# Patient Record
Sex: MALE | Race: WHITE | NOT HISPANIC OR LATINO | Employment: FULL TIME | URBAN - METROPOLITAN AREA
[De-identification: names, ages, dates, MRNs, and addresses within clinical notes are randomized per-mention and may not be internally consistent; named-entity substitution may affect disease eponyms.]

---

## 2021-08-19 ENCOUNTER — TELEPHONE (OUTPATIENT)
Dept: CARDIOLOGY | Facility: MEDICAL CENTER | Age: 51
End: 2021-08-19

## 2021-08-19 NOTE — TELEPHONE ENCOUNTER
Called patient to request records for NP appointment with [facility]. Called to confirm if this will be first time patient is seeing a cardiologist and if pt has had any recent lab work or cardaic imaging done. No answer, left voicemail to call back.

## 2021-08-24 ASSESSMENT — ENCOUNTER SYMPTOMS
VOMITING: 0
ABDOMINAL PAIN: 0
FEVER: 0
PALPITATIONS: 0
SYNCOPE: 0
PND: 0
WEAKNESS: 0
IRREGULAR HEARTBEAT: 0
NAUSEA: 0
DIZZINESS: 0
COUGH: 0
DIARRHEA: 0
NIGHT SWEATS: 0
FOCAL WEAKNESS: 0
ORTHOPNEA: 0
NEAR-SYNCOPE: 0
SHORTNESS OF BREATH: 0
DYSPNEA ON EXERTION: 0
WHEEZING: 0

## 2021-08-24 NOTE — PROGRESS NOTES
Cardiology Initial Consultation Note    Date of note:    8/25/2021    Primary Care Provider:  LEXY Stoddard  Referring Provider:  LEXY Stoddard    Patient Name: Jose Woodruff   YOB: 1970  MRN:              [id number]    Chief Complaint: Syncope    History of Present Illness: Mr. Jose Woodruff is a 51 y.o. male whose current medical problems include dyslipidemia, hypertension, possible paroxysmal afib (diagnosed in 2007 per patient, no recurrence since) who is here for cardiac consultation for syncope.    The patient was recently seen in primary care office on 7/9/2021 by LEXY Stoddard.  During the visit, he reported 2 episodes of syncope, 20 minutes apart.  Lisinopril was increased to 40 mg during the visit due to elevated blood pressure.    The patient presents today to discuss symptoms.  He denies any prior syncope episodes.  He had not had any recurrent episodes since the two episodes in early June.  He denies any prodromal symptoms prior to the syncope.  The first episode he was just standing in his living room, and the next thing he knew he was on the floor.  The second episode he was in the bathroom and felt nauseated.  He denies any chest pain or palpitations prior.  He did not seek care after the episode as he felt back to normal.  The patient walks his dog daily for about an hour, at a fast pace without any chest pain or shortness of breath.  He denies any orthopnea, PND, or leg swelling.  No palpitations.    Of note, the patient had a stress test over 10 years ago in August 2010, which was negative.  He also had an episode of A. fib prior around 2007, which he had not had recurrence.    Cardiovascular Risk Factors:  1. Smoking status: Never smoker  2. Type II Diabetes Mellitus: None/not recently checked  3. Hypertension: On medication   4. Dyslipidemia: Diet controlled   5. Family history of early Coronary Artery Disease in a first degree relative (Male less than 55 years of age;  "Female less than 65 years of age): None  6.  Obesity and/or Metabolic Syndrome: BMI 26.29  7. Sedentary lifestyle: Walks dogs/fast walks    Review of Systems   Constitutional: Negative for fever, malaise/fatigue and night sweats.   Cardiovascular: Negative for chest pain, dyspnea on exertion, irregular heartbeat, leg swelling, near-syncope, orthopnea, palpitations, paroxysmal nocturnal dyspnea and syncope.   Respiratory: Negative for cough, shortness of breath and wheezing.    Gastrointestinal: Negative for abdominal pain, diarrhea, nausea and vomiting.   Neurological: Negative for dizziness, focal weakness and weakness.       All other systems reviewed and are negative.         Current Outpatient Medications   Medication Sig Dispense Refill   • lisinopril (PRINIVIL) 40 MG tablet      • ASPIRIN LOW DOSE 81 MG EC tablet  (Patient not taking: Reported on 8/25/2021)       No current facility-administered medications for this visit.         Not on File      Physical Exam:  Ambulatory Vitals  /72 (BP Location: Right arm, Patient Position: Sitting, BP Cuff Size: Adult)   Pulse 77   Resp 14   Ht 1.753 m (5' 9\")   Wt 80.7 kg (178 lb)   SpO2 97%    Oxygen Therapy:  Pulse Oximetry: 97 %  BP Readings from Last 4 Encounters:   08/25/21 114/72       Weight/BMI: Body mass index is 26.29 kg/m².  Wt Readings from Last 4 Encounters:   08/25/21 80.7 kg (178 lb)         General: Well appearing and in no apparent distress  Eyes: nl conjunctiva, no icteric sclera  ENT: wearing a mask, normal external appearance of ears  Neck: no visible JVP,  no carotid bruits  Lungs: normal respiratory effort, CTAB  Heart: RRR, no murmurs, no rubs or gallops,  no edema bilateral lower extremities. No LV/RV heave on cardiac palpatation. + bilateral radial pulses.  + bilateral dp pulses.   Abdomen: soft, non tender, non distended, no masses, normal bowel sounds.  No HSM.  Extremities/MSK: no clubbing, no cyanosis  Neurological: No focal " sensory deficits  Psychiatric: Appropriate affect, A/O x 3, intact judgement and insight  Skin: Warm extremities      Lab Data Review:  No results found for: CHOLSTRLTOT, LDL, HDL, TRIGLYCERIDE    No results found for: SODIUM, POTASSIUM, CHLORIDE, CO2, GLUCOSE, BUN, CREATININE, BUNCREATRAT, GLOMRATE  No results found for: ALKPHOSPHAT, ASTSGOT, ALTSGPT, TBILIRUBIN   No results found for: WBC  No results found for: HBA1C      Cardiac Imaging and Procedures Review:    EKG dated 8/25/2021: My personal interpretation is sinus rhythm, PAC      Assessment & Plan     1. Hypertension, unspecified type  EKG    EC-ECHOCARDIOGRAM COMPLETE W/O CONT   2. Paroxysmal A-fib (HCC)  EC-ECHOCARDIOGRAM COMPLETE W/O CONT   3. Syncope and collapse  EC-ECHOCARDIOGRAM COMPLETE W/O CONT    Cardiac Event Monitor   4. Dyslipidemia           Shared Medical Decision Making:    Syncope  No prodromal symptoms.  No prior episodes or recurrence  -We will obtain echocardiogram to assess LVEF and any structural abnormalities  -Event monitor to assess for any arrhythmias or heart blocks    Hypertension  BP well controlled this visit  -Continue lisinopril 40 mg daily    Possible paroxysmal A. Fib  As reported per the patient  [id number] of 1  -Continue aspirin 81 mg daily  -Event monitor as above    Dyslipidemia  Per patient report , was told no need for medications at this time  -We will obtain records from PCP clinic to calculate ASCVD risk    All of the patient's excellent questions were answered to the best of my knowledge and to his satisfaction.  It was a pleasure seeing Mr. Jose Woodruff in my clinic today. Return in about 3 months (around 11/25/2021). Patient is aware to call the cardiology clinic with any questions or concerns.      Maxine Cardenas MD  [facility]  [place] [facility], [address]  [address] [address]  Phone: [phone]  Fax: [phone]

## 2021-08-25 ENCOUNTER — OFFICE VISIT (OUTPATIENT)
Dept: CARDIOLOGY | Facility: MEDICAL CENTER | Age: 51
End: 2021-08-25
Payer: MEDICAID

## 2021-08-25 VITALS
DIASTOLIC BLOOD PRESSURE: 72 MMHG | HEIGHT: 69 IN | OXYGEN SATURATION: 97 % | BODY MASS INDEX: 26.36 KG/M2 | RESPIRATION RATE: 14 BRPM | SYSTOLIC BLOOD PRESSURE: 114 MMHG | HEART RATE: 77 BPM | WEIGHT: 178 LBS

## 2021-08-25 DIAGNOSIS — E78.5 DYSLIPIDEMIA: ICD-10-CM

## 2021-08-25 DIAGNOSIS — R55 SYNCOPE AND COLLAPSE: ICD-10-CM

## 2021-08-25 DIAGNOSIS — I10 HYPERTENSION, UNSPECIFIED TYPE: ICD-10-CM

## 2021-08-25 DIAGNOSIS — I48.0 PAROXYSMAL A-FIB (HCC): ICD-10-CM

## 2021-08-25 LAB — EKG IMPRESSION: NORMAL

## 2021-08-25 PROCEDURE — 99214 OFFICE O/P EST MOD 30 MIN: CPT | Performed by: STUDENT IN AN ORGANIZED HEALTH CARE EDUCATION/TRAINING PROGRAM

## 2021-08-25 PROCEDURE — 93000 ELECTROCARDIOGRAM COMPLETE: CPT | Performed by: STUDENT IN AN ORGANIZED HEALTH CARE EDUCATION/TRAINING PROGRAM

## 2021-08-25 RX ORDER — LISINOPRIL 40 MG/1
TABLET ORAL
COMMUNITY
Start: 2021-08-10

## 2021-08-25 RX ORDER — LISINOPRIL 20 MG/1
TABLET ORAL
COMMUNITY
Start: 2021-06-02 | End: 2021-08-25

## 2021-08-25 RX ORDER — ASPIRIN 81 MG/1
TABLET, COATED ORAL
COMMUNITY
Start: 2021-08-17

## 2021-08-30 ENCOUNTER — TELEPHONE (OUTPATIENT)
Dept: CARDIOLOGY | Facility: MEDICAL CENTER | Age: 51
End: 2021-08-30

## 2021-08-30 ENCOUNTER — NON-PROVIDER VISIT (OUTPATIENT)
Dept: CARDIOLOGY | Facility: MEDICAL CENTER | Age: 51
End: 2021-08-30
Payer: MEDICAID

## 2021-08-30 DIAGNOSIS — I49.3 PVC (PREMATURE VENTRICULAR CONTRACTION): ICD-10-CM

## 2021-08-30 DIAGNOSIS — I49.1 PREMATURE ATRIAL CONTRACTION: ICD-10-CM

## 2021-08-30 DIAGNOSIS — I47.10 SVT (SUPRAVENTRICULAR TACHYCARDIA) (HCC): ICD-10-CM

## 2021-08-30 NOTE — TELEPHONE ENCOUNTER
Home enrollment completed for the 14 day [employer] XT Holter monitoring program per Maxine Cardenas MD ([employer] originally ordered: not a covered benefit under patient's insurance plan) Monitor to be mailed to patient by [employer].    >Currently pending EOS.

## 2021-10-06 DIAGNOSIS — R55 SYNCOPE AND COLLAPSE: ICD-10-CM

## 2021-10-06 PROCEDURE — 93248 EXT ECG>7D<15D REV&INTERPJ: CPT | Performed by: STUDENT IN AN ORGANIZED HEALTH CARE EDUCATION/TRAINING PROGRAM

## 2021-10-18 ENCOUNTER — TELEPHONE (OUTPATIENT)
Dept: CARDIOLOGY | Facility: MEDICAL CENTER | Age: 51
End: 2021-10-18

## 2021-10-18 NOTE — TELEPHONE ENCOUNTER
----- Message from Maxine Cardenas M.D. sent at 10/7/2021  1:47 PM PDT -----  Prasanna Serrano,  Could you let the patient know that his event monitor showed a few runs of supraventricular tachycardia?  Otherwise, it looks normal.  He did not have any symptoms during those episodes.  We will discuss more when I see him in clinic unless he is having palpitations.  Thank you!

## 2021-10-18 NOTE — LETTER
"October 18, 2021        Jose Woodruff  [address]  [place] [address]          Dear Jose,    We have received the results of your recent: Cardiac Event Monitor    Your test came back and Dr. Cardenas states, \"his event monitor showed a few runs of supraventricular tachycardia, otherwise, it looks normal.  He did not have any symptoms during those episodes.  We will discuss more when I see him in clinic unless he is having palpitations.\" Please follow up as previously discussed with your physician.      Feel free to call us with any questions.        Sincerely,          Maggie FAUSTIN  Electronically Signed  "

## 2021-11-08 ENCOUNTER — APPOINTMENT (OUTPATIENT)
Dept: CARDIOLOGY | Facility: MEDICAL CENTER | Age: 51
End: 2021-11-08
Attending: STUDENT IN AN ORGANIZED HEALTH CARE EDUCATION/TRAINING PROGRAM
Payer: MEDICAID

## 2021-11-08 DIAGNOSIS — I48.0 PAROXYSMAL A-FIB (HCC): ICD-10-CM

## 2021-11-08 DIAGNOSIS — R55 SYNCOPE AND COLLAPSE: ICD-10-CM

## 2021-11-08 DIAGNOSIS — I10 HYPERTENSION, UNSPECIFIED TYPE: ICD-10-CM

## 2021-11-08 LAB
LV EJECT FRACT MOD 2C 99903: 71.27
LV EJECT FRACT MOD 4C 99902: 64.32
LV EJECT FRACT MOD BP 99901: 69.63

## 2021-11-08 PROCEDURE — 93306 TTE W/DOPPLER COMPLETE: CPT

## 2021-11-08 PROCEDURE — 93306 TTE W/DOPPLER COMPLETE: CPT | Mod: 26 | Performed by: STUDENT IN AN ORGANIZED HEALTH CARE EDUCATION/TRAINING PROGRAM

## 2021-11-09 ENCOUNTER — TELEPHONE (OUTPATIENT)
Dept: CARDIOLOGY | Facility: MEDICAL CENTER | Age: 51
End: 2021-11-09

## 2021-11-09 NOTE — TELEPHONE ENCOUNTER
EC-ECHOCARDIOGRAM COMPLETE W/O CONT    Message  Received: Yesterday  OTTO Locke R.N. Hi Kaitlin,   Could you let the patient know his echo was normal? Thank you!!        Left message on patient's personal voicemail with results. Advised to call back if he had any additional questions or concerns.

## 2021-12-13 ASSESSMENT — ENCOUNTER SYMPTOMS
DYSPNEA ON EXERTION: 0
ORTHOPNEA: 0
PND: 0
IRREGULAR HEARTBEAT: 0
WEAKNESS: 0
NIGHT SWEATS: 0
NEAR-SYNCOPE: 0
FOCAL WEAKNESS: 0
VOMITING: 0
COUGH: 0
DIARRHEA: 0
WHEEZING: 0
PALPITATIONS: 0
FEVER: 0
ABDOMINAL PAIN: 0
SYNCOPE: 0
DIZZINESS: 0
NAUSEA: 0
SHORTNESS OF BREATH: 0

## 2021-12-13 NOTE — PROGRESS NOTES
Cardiology Follow-up Consultation Note    Date of note:    12/14/21  Primary Care Provider:  LEXY Stoddard    Patient Name: Jose Woodruff   YOB: 1970  MRN:              [id number]    Chief Complaint: Follow-up syncope    History of Present Illness: Mr. Jose Woodruff is a 51 y.o. male whose current medical problems include dyslipidemia, hypertension, possible paroxysmal afib (diagnosed in 2007 per patient, no recurrence since) who is here for follow-up for syncope.    The patient was last seen in clinic on 8/25/2021 for 2 episodes of syncope 20 minutes apart.  The patient had not had any previous episodes or any recurrent episodes since then.  He underwent an echocardiogram, which showed normal LVEF without any structural abnormalities.  He also underwent an event monitor, which did not show any A. fib but did show 10 runs of SVT, during which he did not have any symptoms.    The patient returns today for follow-up.  He has not had any syncope episodes since the last episode.  He denies any chest pain or shortness of breath.  Denies any palpitations.  No orthopnea, PND, or leg swelling.  No syncope or presyncopal episodes.    Cardiovascular Risk Factors:  1. Smoking status: Never smoker  2. Type II Diabetes Mellitus: None/not recently checked  3. Hypertension: On medication   4. Dyslipidemia: Diet controlled   5. Family history of early Coronary Artery Disease in a first degree relative (Male less than 55 years of age; Female less than 65 years of age): None  6.  Obesity and/or Metabolic Syndrome: BMI 26.29  7. Sedentary lifestyle: Walks dogs/fast walks    Review of Systems   Constitutional: Negative for fever, malaise/fatigue and night sweats.   Cardiovascular: Negative for chest pain, dyspnea on exertion, irregular heartbeat, leg swelling, near-syncope, orthopnea, palpitations, paroxysmal nocturnal dyspnea and syncope.   Respiratory: Negative for cough, shortness of breath and wheezing.   "  Gastrointestinal: Negative for abdominal pain, diarrhea, nausea and vomiting.   Neurological: Negative for dizziness, focal weakness and weakness.       All other systems reviewed and are negative.         Current Outpatient Medications   Medication Sig Dispense Refill   • lisinopril (PRINIVIL) 40 MG tablet      • ASPIRIN LOW DOSE 81 MG EC tablet        No current facility-administered medications for this visit.         Not on File      Physical Exam:  Ambulatory Vitals  /80 (BP Location: Left arm, Patient Position: Sitting, BP Cuff Size: Adult)   Pulse 81   Resp 16   Ht 1.753 m (5' 9\")   Wt 83.9 kg (185 lb)   SpO2 95%    Oxygen Therapy:  Pulse Oximetry: 95 %  BP Readings from Last 4 Encounters:   12/14/21 116/80   08/25/21 114/72       Weight/BMI: Body mass index is 27.32 kg/m².  Wt Readings from Last 4 Encounters:   12/14/21 83.9 kg (185 lb)   08/25/21 80.7 kg (178 lb)         General: Well appearing and in no apparent distress  Eyes: nl conjunctiva, no icteric sclera  ENT: wearing a mask, normal external appearance of ears  Neck: no visible JVP,  no carotid bruits  Lungs: normal respiratory effort, CTAB  Heart: RRR, no murmurs, no rubs or gallops,  no edema bilateral lower extremities. No LV/RV heave on cardiac palpatation. + bilateral radial pulses.  + bilateral dp pulses.   Abdomen: soft, non tender, non distended, no masses, normal bowel sounds.  No HSM.  Extremities/MSK: no clubbing, no cyanosis  Neurological: No focal sensory deficits  Psychiatric: Appropriate affect, A/O x 3, intact judgement and insight  Skin: Warm extremities      Lab Data Review:  No results found for: CHOLSTRLTOT, LDL, HDL, TRIGLYCERIDE    No results found for: SODIUM, POTASSIUM, CHLORIDE, CO2, GLUCOSE, BUN, CREATININE, BUNCREATRAT, GLOMRATE  No results found for: ALKPHOSPHAT, ASTSGOT, ALTSGPT, TBILIRUBIN   No results found for: WBC  No results found for: HBA1C      Cardiac Imaging and Procedures Review:    EKG dated " 8/25/2021: My personal interpretation is sinus rhythm, PAC    Echocardiogram 11/8/2021  CONCLUSIONS  Normal left ventricular size, thickness, systolic function, and   diastolic function. The left ventricular ejection fraction is visually   estimated to be 65%.  Normal right ventricular size and systolic function.  No significant valvular abnormalities.   Normal right ventricular systolic pressure, estimated to be 22 mmHg  No prior study is available for comparison.        Event monitor 10/6/2021  The patient was monitored for 13 days and 20 hours.  Predominant underlying rhythm was sinus rhythm.  Minimum heart rate 39 bpm, maximum heart rate 207 bpm, average heart rate 78 bpm.  10 runs of supraventricular tachycardia occurred, the fastest run lasting 8 beats with a max rate of 207 bpm, the longest lasting 10 beats with an average rate of 133 bpm.  Rare PACs less than 1%.  Rare PVCs less than 1%.  Symptoms correlated with sinus rhythm with heart rate 69 to 96 bpm without ectopy.     Assessment & Plan     1. Syncope and collapse     2. Hypertension, unspecified type     3. Dyslipidemia           Shared Medical Decision Making:    Syncope, resolved  No prodromal symptoms.  No prior episodes or recurrence.  Echo showed normal LVEF without any structural abnormalities.  Event monitor showed 10 runs of SVTs, although not related to symptoms.    Hypertension  BP well controlled this visit  -Continue lisinopril 40 mg daily  -Discussed possibly changing from lisinopril to a beta-blocker for SVTs, however, as the patient is not symptomatic, will hold off changing at this time.    Possible paroxysmal A. Fib  As reported per the patient  [id number] of 1  -Continue aspirin 81 mg daily  -Event monitor did not show any A. fib    Dyslipidemia  Per patient report , was told no need for medications at this time  -Check lipids yearly, the patient reports that he will follow up with PCP.    All of the patient's excellent  questions were answered to the best of my knowledge and to his satisfaction.  It was a pleasure seeing Mr. Jose Woodruff in my clinic today. Return in about 1 year (around 12/14/2022), or if symptoms worsen or fail to improve. Patient is aware to call the cardiology clinic with any questions or concerns.      Maxine Cardenas MD  [facility], [address]  [address] [place] [address]  Phone: [phone]  Fax: [phone]

## 2021-12-14 ENCOUNTER — OFFICE VISIT (OUTPATIENT)
Dept: CARDIOLOGY | Facility: MEDICAL CENTER | Age: 51
End: 2021-12-14
Payer: MEDICAID

## 2021-12-14 VITALS
HEART RATE: 81 BPM | WEIGHT: 185 LBS | SYSTOLIC BLOOD PRESSURE: 116 MMHG | HEIGHT: 69 IN | BODY MASS INDEX: 27.4 KG/M2 | OXYGEN SATURATION: 95 % | RESPIRATION RATE: 16 BRPM | DIASTOLIC BLOOD PRESSURE: 80 MMHG

## 2021-12-14 DIAGNOSIS — E78.5 DYSLIPIDEMIA: ICD-10-CM

## 2021-12-14 DIAGNOSIS — R55 SYNCOPE AND COLLAPSE: ICD-10-CM

## 2021-12-14 DIAGNOSIS — I10 HYPERTENSION, UNSPECIFIED TYPE: ICD-10-CM

## 2021-12-14 PROCEDURE — 99214 OFFICE O/P EST MOD 30 MIN: CPT | Performed by: STUDENT IN AN ORGANIZED HEALTH CARE EDUCATION/TRAINING PROGRAM

## 2022-01-04 ENCOUNTER — APPOINTMENT (OUTPATIENT)
Dept: CARDIOLOGY | Facility: MEDICAL CENTER | Age: 52
End: 2022-01-04
Attending: STUDENT IN AN ORGANIZED HEALTH CARE EDUCATION/TRAINING PROGRAM
Payer: MEDICAID